# Patient Record
Sex: MALE | Race: WHITE | URBAN - METROPOLITAN AREA
[De-identification: names, ages, dates, MRNs, and addresses within clinical notes are randomized per-mention and may not be internally consistent; named-entity substitution may affect disease eponyms.]

---

## 2022-06-15 ENCOUNTER — ONCOLOGY VISIT (OUTPATIENT)
Dept: TRANSPLANT | Facility: CLINIC | Age: 27
End: 2022-06-15
Attending: GENETIC COUNSELOR, MS

## 2022-06-15 DIAGNOSIS — Z84.81 FAMILY HISTORY OF GENETIC DISEASE CARRIER: Primary | ICD-10-CM

## 2022-06-15 PROCEDURE — 999N000069 HC STATISTIC GENETIC COUNSELING, < 16 MIN: Performed by: GENETIC COUNSELOR, MS

## 2022-06-21 NOTE — PROGRESS NOTES
Anastasiya 15, 2022    It was a pleasure meeting with the Bob family in the [facility] at the request of Dr. Antonio Blount to review the prior genetic testing that was completed for Garrison and his son, Anselmo, and to discuss additional testing. 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 Garrison indicated an interest in further testing for the PLK4 pathogenic variant that was also identified as a part of Anselmo's prior testing. After reviewed the risks, benefits, limitations and potential for genetic discrimination, Garrison consented to testing for the familial PLK4 gene variant through [facility]. A test kit will be shipped to his home address for sample collection. Testing is being offered free of charge by the testing laboratory.    For a full summary of our discussion, please see the progress note under my encounter with Garrison's son, Anselmo.    Sincerely,    Caro Sanches MS, MA, [facility]  Licensed, Certified Genetic Counselor  Pediatric Blood & Marrow Transplant  [phone]  [email or web address]    Approximate time spent in consultation: 5 minutes

## 2022-08-16 DIAGNOSIS — Z84.81 FAMILY HISTORY OF GENETIC DISEASE CARRIER: Primary | ICD-10-CM

## 2022-08-19 ENCOUNTER — TELEPHONE (OUTPATIENT)
Dept: TRANSPLANT | Facility: CLINIC | Age: 27
End: 2022-08-19

## 2022-08-19 NOTE — LETTER
August 19, 2022      TO: Garrison Rojas  [place] [id number]         Dear Garrison,    It was a pleasure speaking with you on August 19, 2022 to review the results of your PLK4 gene testing. Below is a summary of our discussion for your records:    Pertinent Medical and Family History: Previously, your son was found to have a single variant in his PLK4 gene called c.60del. As individuals need to inherited a variant in BOTH copies of their PLK4 gene to have disease symptoms, these results were most consistent with your son being a carrier for PLK4-associated disease. Limited information is available on individuals with variants on both copies on their PLK4 gene. Some of the reported symptoms include microcephaly, short stature, vision impairment, intellectual disability, deafness and skeletal abnormalities (PMID: 48301447).    Genetic Testing: Previously, you consented to testing targeting the PLK4 gene through [facility]. The results were as follows:    RESULTS: Positive Carrier Status. 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    Garrison, you were found to have the c.60del variant in one copy of your PLK4 gene. 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     Discussion: In any future pregnancy, there is a 1/2 chance of passing on the PLK4 gene variant and a 1/2 chance of not passing on the PLK4 gene variant. The chance a carrier of a PLK4 gene variant would have a child with PLK4-associated disease would be based on the carrier status of their partner. When two individuals both carry a single PLK4 gene variant and they have children together, there is a 1/4 chance that the pregnancy will inherit both PLK4 gene variants and have PLK4-associated disease. Reproductive genetic counseling is available based on this finding at any point in the future if you are interested. A preconception/prenatal genetic counselor can be found  "through the [email or web address] website.    Other relatives could also be carriers of the PLK4 gene variant and it is important to share a copy of your test results with your extended family so they can consider testing. A family letter can be provided at any point in the future if additional resources for communicating your test results with relatives would be helpful.    It was a pleasure speaking with you regarding these results. Please find a copy of your test results enclosed for your records. The above information is based on our current understanding of the genetic findings in your family. You are encouraged to reach out annually for any updates to your family's genetic testing information as our understanding of the genetic findings in your family may change over time. If you have any additional questions or concerns, please do not hesitate to call me at [phone].    Sincerely,    Caro Sanches MS, MA, [facility]  Licensed, Certified Genetic Counselor  Pediatric Blood & Marrow Transplant  [phone]  [email or web address]    ENCLOSURE: Please include a copy of Garrison's results under the \"Laboratory\" tab titled \"laboratory miscellaneous order\" from 8/2/22.    "

## 2022-08-19 NOTE — TELEPHONE ENCOUNTER
August 19, 2022    I called and spoke with Garrison and shared that Garrison's genetic testing revealed that he is a carrier of the PLK4 gene variant that was previously identified through his son's testing. A results letter will be sent to Garrison at his home address. Additional questions or concerns were denied.        Caro Sanches MS, MA, [facility]  Licensed, Certified Genetic Counselor  Pediatric Blood & Marrow Transplant  [phone]  [email or web address]

## 2022-08-19 NOTE — Clinical Note
Attn: [facility]  Please print and send a copy of this letter and result to the patient at the home address.  Thank you!  Caro